# Patient Record
Sex: MALE | Race: WHITE | NOT HISPANIC OR LATINO | Employment: OTHER | URBAN - METROPOLITAN AREA
[De-identification: names, ages, dates, MRNs, and addresses within clinical notes are randomized per-mention and may not be internally consistent; named-entity substitution may affect disease eponyms.]

---

## 2018-10-09 ENCOUNTER — OFFICE VISIT - RIVER FALLS (OUTPATIENT)
Dept: FAMILY MEDICINE | Facility: CLINIC | Age: 68
End: 2018-10-09

## 2018-10-09 ENCOUNTER — AMBULATORY - RIVER FALLS (OUTPATIENT)
Dept: FAMILY MEDICINE | Facility: CLINIC | Age: 68
End: 2018-10-09

## 2018-10-09 ASSESSMENT — MIFFLIN-ST. JEOR: SCORE: 1616.29

## 2022-02-11 VITALS
HEART RATE: 55 BPM | DIASTOLIC BLOOD PRESSURE: 76 MMHG | HEIGHT: 69 IN | BODY MASS INDEX: 28.44 KG/M2 | SYSTOLIC BLOOD PRESSURE: 128 MMHG | OXYGEN SATURATION: 96 % | WEIGHT: 192 LBS

## 2022-02-16 NOTE — PROGRESS NOTES
Patient:   DISHA SHELTON            MRN: [id number]            FIN: [id number]               Age:   68 years     Sex:  Male     :  1950   Associated Diagnoses:   Encounter for examination required by Department of Transportation (DOT)   Author:   Solomon Killian MD      Results Review   General results   Today's results   10/9/2018 12:34 PM CDT hepatitis B adult vaccine 1 mL mL   10/9/2018 12:33 PM CDT influenza virus vaccine, inactivated 0.5 mL mL   10/9/2018 10:36 AM CDT Height Measured - Standard 69 in    Weight Measured - Standard 192 lb    BSA 2.06 m2    Body Mass Index 28.35 kg/m2  HI    Peripheral Pulse Rate 55 bpm  LOW    Pulse Site Radial artery    Systolic Blood Pressure 128 mmHg    Diastolic Blood Pressure 76 mmHg    Mean Arterial Pressure 93 mmHg    BP Site Right arm    Oxygen Saturation 96 %    Allergies Verified? Yes    Medication History Verified? Yes    Medical History Verified? No    Tobacco Use? Former smoker    Pre-Visit Planning Status Completed    Chief Complaint DOT exam-[facility] health,     Consent for Immunization Exchange Consent Granted    Consent for Immunizations to Providers Consent Granted    Comprehensive Intake Form Comprehensive Intake Form    Intake Form Comprehensive Intake   10/9/2018 10:25 AM CDT UA pH 5.5    UA Specific Gravity 1.020    UA Glucose NEGATIVE    UA Bilirubin NEGATIVE    UA Ketones NEGATIVE    U Occult Blood NEGATIVE    UA Protein NEGATIVE    UA Nitrite NEGATIVE    UA Leuk Est NEGATIVE    Lab Report [facility] # [id number]   10/9/2018 10:22 AM CDT Release of Information DOT PHYSICAL PER [employer] [facility] [employer]   10/9/2018 10:20 AM CDT General Diagnostic Order DOT PHYSICAL PER [facility] [employer]         Health Status   Allergies:    Allergic Reactions (Selected)  Severity Not Documented  Penicillins (No reactions were documented)   Medications:  (Selected)      Problem list:    All Problems  Allergic Rhinitis, Cause Unspecified / 477.9 / Confirmed  Mnire's disease,  unspecified / 386.00 / Confirmed  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  Resolved: Unspecified Hemorrhoids without Mention of Complication / 455.6  Resolved: Lateral Epicondylitis / 726.32  Resolved: Other Affections of Shoulder Region, Not Elsewhere Classified / 726.2  bilateral      Subjective   Problem:  Please see scanned in copy of DOT physical      Objective   Vital Signs   10/9/2018 10:36 AM CDT Peripheral Pulse Rate 55 bpm  LOW    Pulse Site Radial artery    Systolic Blood Pressure 128 mmHg    Diastolic Blood Pressure 76 mmHg    Mean Arterial Pressure 93 mmHg    BP Site Right arm    Oxygen Saturation 96 %      Measurements from flowsheet : Measurements   10/9/2018 10:36 AM CDT Height Measured - Standard 69 in    Weight Measured - Standard 192 lb    BSA 2.06 m2    Body Mass Index 28.35 kg/m2  HI         Plan   Impression and Plan:  Orders   .    Diagnosis     Encounter for examination required by Department of Transportation (DOT) (UYE73-XC Z02.89).     .    Cmxtwenty--2 year certificate

## 2022-12-18 ENCOUNTER — NURSE TRIAGE (OUTPATIENT)
Dept: NURSING | Facility: CLINIC | Age: 72
End: 2022-12-18

## 2022-12-18 NOTE — TELEPHONE ENCOUNTER
[facility] Unit Coordinator at [facility] calling requesting to schedule hospital follow up with Dr Orantes Virtual Visit on 12/21 or 12/22 for COVID 19.     Warm transferred to Central Scheduling. Advised Central Scheduling would send care team message if unable to schedule as requested.    Keren Kirkpatrick, RN  [facility]      Reason for Disposition    Requesting regular office appointment    Additional Information    Negative: [1] Caller is not with the adult (patient) AND [2] reporting urgent symptoms    Negative: Lab result questions    Negative: Medication questions    Negative: Caller can't be reached by phone    Negative: Caller has already spoken to PCP or another triager    Negative: RN needs further essential information from caller in order to complete triage    Protocols used: INFORMATION ONLY CALL - NO TRIAGE-A-

## 2022-12-22 ENCOUNTER — VIRTUAL VISIT (OUTPATIENT)
Dept: FAMILY MEDICINE | Facility: CLINIC | Age: 72
End: 2022-12-22
Payer: COMMERCIAL

## 2022-12-22 DIAGNOSIS — K21.9 GASTROESOPHAGEAL REFLUX DISEASE WITHOUT ESOPHAGITIS: ICD-10-CM

## 2022-12-22 DIAGNOSIS — U07.1 PNEUMONIA DUE TO 2019 NOVEL CORONAVIRUS: Primary | ICD-10-CM

## 2022-12-22 DIAGNOSIS — J12.82 PNEUMONIA DUE TO 2019 NOVEL CORONAVIRUS: Primary | ICD-10-CM

## 2022-12-22 PROCEDURE — 99213 OFFICE O/P EST LOW 20 MIN: CPT | Mod: 95 | Performed by: INTERNAL MEDICINE

## 2022-12-22 RX ORDER — CEFUROXIME AXETIL 500 MG/1
500 TABLET ORAL
COMMUNITY
Start: 2022-12-20 | End: 2022-12-23

## 2022-12-22 RX ORDER — FAMOTIDINE 20 MG/1
20 TABLET, FILM COATED ORAL
COMMUNITY
Start: 2022-12-20

## 2022-12-22 NOTE — PROGRESS NOTES
Henrry is a 72 year old who is being evaluated via a billable telephone visit.      What phone number would you like to be contacted at? mobile  How would you like to obtain your AVS? Mail a copy    Distant Location (provider location):  On-site    Assessment & Plan     Pneumonia due to 2019 novel coronavirus  [facility] due to COVID-pneumonia; initially with significant oxygen needs at 10 L via oxygen mask  - CTA obtained; no PE  -Precipitously improved on dexamethasone, IV antibiotics and supplemental oxygen use  -Discharged on cefuroxime (high procal) though clinical course seems most consistent with likely all viral pneumonia  -Using pulse oximetry at home which is consistently been in high 90s since discharge    GERD  -Started on famotidine during hospitalization; encouraged him to continue indefinitely though can reduce down to daily dosing  -Advised if seeing worsening of his reflux symptoms are persistent dysphagia features, then we should discuss further in clinic.         MED REC REQUIRED  Post Medication Reconciliation Status:  Discharge medications reconciled, continue medications without change      No follow-ups on file.    Braulio Orantes MD  [facility]   Henrry is a 72 year old, presenting for the following health issues:  Spoke with Joey for hospital follow-up after admission here locally due to COVID-pneumonia and hypoxic respiratory failure.  Needing significant supplemental oxygen as high as 10 L on oxygen mask on admission.  He had a rapid tapering of oxygen down to ambient air with short-term use of dexamethasone and IV antibiotics.  Rising procalcitonin in the hospital stay.  Had CTA of chest later performed that hospitalization that showed no evidence of PE.  Since discharge states he feels well.  Has been using home pulse oximeter showing oxygen saturations maintaining in the high 90s.  He does feel  "fatigued and has been pacing himself and understands it will take him a while to get back to previous activities.  Hospital F/U (COVID, congestion, \"stuffy head\", fatigue and SOB)      HPI       Hospital Follow-up Visit:    Hospital/Nursing Home/IP Rehab Facility: [facility]  Date of Admission: 12/15/2022  Date of Discharge: 12/20/2022  Reason(s) for Admission: COVID    Was your hospitalization related to COVID-19? YES   How are you feeling today? Better  In the past 24 hours have you had shortness of breath when speaking, walking, or climbing stairs? My breathing issues have stayed the same  Do you have a cough? Yes, I have a cough but it's not worse  When is the last time you had a fever greater than 100? Unknown    Are you having any other symptoms? Fatigue  Do you have any other stressors you would like to discuss with your provider? No           PHQ Assesment Total Score(s) 12/22/2022   PHQ-2 Score 0   Some recent data might be hidden                   Summary of hospitalization:  CareEverywhere information obtained and reviewed  Diagnostic Tests/Treatments reviewed.  Follow up needed: none  Other Healthcare Providers Involved in Patient s Care:         None  Update since discharge: improved.         Plan of care communicated with patient             Review of Systems   Constitutional, HEENT, cardiovascular, pulmonary, gi and gu systems are negative, except as otherwise noted.      Objective           Vitals:  No vitals were obtained today due to virtual visit.    Physical Exam   healthy, alert and no distress  PSYCH: Alert and oriented times 3; coherent speech, normal   rate and volume, able to articulate logical thoughts, able   to abstract reason, no tangential thoughts, no hallucinations   or delusions  His affect is normal  RESP: No cough, no audible wheezing, able to talk in full sentences  Remainder of exam unable to be completed due to telephone visits            Phone call duration: 15 minutes    "

## 2023-01-30 PROBLEM — Z00.00 HEALTHCARE MAINTENANCE: Status: ACTIVE | Noted: 2023-01-30

## 2023-01-31 ENCOUNTER — TELEPHONE (OUTPATIENT)
Dept: FAMILY MEDICINE | Facility: CLINIC | Age: 73
End: 2023-01-31

## 2023-01-31 ENCOUNTER — OFFICE VISIT (OUTPATIENT)
Dept: FAMILY MEDICINE | Facility: CLINIC | Age: 73
End: 2023-01-31
Payer: COMMERCIAL

## 2023-01-31 VITALS
RESPIRATION RATE: 18 BRPM | TEMPERATURE: 98.7 F | WEIGHT: 204 LBS | HEIGHT: 69 IN | BODY MASS INDEX: 30.21 KG/M2 | DIASTOLIC BLOOD PRESSURE: 78 MMHG | HEART RATE: 66 BPM | SYSTOLIC BLOOD PRESSURE: 138 MMHG

## 2023-01-31 DIAGNOSIS — F39 UNSPECIFIED MOOD (AFFECTIVE) DISORDER (H): ICD-10-CM

## 2023-01-31 DIAGNOSIS — K21.00 GASTROESOPHAGEAL REFLUX DISEASE WITH ESOPHAGITIS WITHOUT HEMORRHAGE: Primary | ICD-10-CM

## 2023-01-31 DIAGNOSIS — K21.00 GASTROESOPHAGEAL REFLUX DISEASE WITH ESOPHAGITIS WITHOUT HEMORRHAGE: ICD-10-CM

## 2023-01-31 DIAGNOSIS — Z00.00 HEALTHCARE MAINTENANCE: ICD-10-CM

## 2023-01-31 DIAGNOSIS — K21.9 GASTROESOPHAGEAL REFLUX DISEASE WITHOUT ESOPHAGITIS: Primary | ICD-10-CM

## 2023-01-31 DIAGNOSIS — K22.2 ESOPHAGEAL STRICTURE: ICD-10-CM

## 2023-01-31 DIAGNOSIS — H81.03 MENIERE'S DISEASE, BILATERAL: ICD-10-CM

## 2023-01-31 PROCEDURE — 99214 OFFICE O/P EST MOD 30 MIN: CPT | Performed by: INTERNAL MEDICINE

## 2023-01-31 RX ORDER — OMEPRAZOLE 40 MG/1
40 CAPSULE, DELAYED RELEASE ORAL DAILY
Qty: 90 CAPSULE | Refills: 3 | Status: SHIPPED | OUTPATIENT
Start: 2023-01-31

## 2023-01-31 RX ORDER — ONDANSETRON 4 MG/1
4-8 TABLET, ORALLY DISINTEGRATING ORAL EVERY 8 HOURS PRN
Qty: 30 TABLET | Refills: 1 | Status: SHIPPED | OUTPATIENT
Start: 2023-01-31

## 2023-01-31 NOTE — ASSESSMENT & PLAN NOTE
GERD -persistently worsening symptoms with dysphagia concerning for likely esophageal stricture  -Partial symptom improvement with Pepcid 20 mg daily  -Advised to escalate to omeprazole 40 mg daily he can continue to use famotidine as needed  -Making arrangements for barium swallow  -If still seeing significant dysphagia despite high-dose PPI, would plan on referral to GI for likely upper endoscopy

## 2023-01-31 NOTE — PROGRESS NOTES
"  Assessment & Plan   Problem List Items Addressed This Visit        Nervous and Auditory    Meniere's disease, bilateral     Has been working with ENT  -Historically has not responded well to as needed medications  -Provided prescription for oral disintegrating tablet Zofran to have on hand         Relevant Medications    ondansetron (ZOFRAN ODT) 4 MG ODT tab       Digestive    Gastroesophageal reflux disease with esophagitis without hemorrhage - Primary     GERD -persistently worsening symptoms with dysphagia concerning for likely esophageal stricture  -Partial symptom improvement with Pepcid 20 mg daily  -Advised to escalate to omeprazole 40 mg daily he can continue to use famotidine as needed  -Making arrangements for barium swallow  -If still seeing significant dysphagia despite high-dose PPI, would plan on referral to GI for likely upper endoscopy         Relevant Medications    omeprazole (PRILOSEC) 20 MG DR capsule    Esophageal stricture    Relevant Orders    XR Esophagram w Upper GI       Behavioral    Unspecified mood (affective) disorder (H)       Other    Healthcare maintenance     [facility]   - recalls pneumococcal vaccine x 1   - would be eligible for prevnar 20   - consensus to forego further PSA  - colonoscopy in 2020  - recall cholesterol, glucose testing through [facility] in recent years  - 12/2022: COVID pneumonia hospitalized with transient hypoxic resp failure               25 minutes spent on the date of the encounter doing review of test results, patient visit and documentation        BMI:   Estimated body mass index is 30.13 kg/m  as calculated from the following:    Height as of this encounter: 1.753 m (5' 9\").    Weight as of this encounter: 92.5 kg (204 lb).           No follow-ups on file.    Braulio Orantes MD  [facility]    Devan Slaes is a 72 year old, presenting for the following health " "issues:  Presents for follow-up.  Feels well since his COVID illness.  Would like to get more active and back exercising in the gym.  No real residual dyspnea concerns.  Still fairly regular reflux symptoms.  Certainly improved since famotidine introduction though having fairly regular nighttime symptoms as well as ongoing dysphagia.  Weight stable.  Lab Only (PSA request), Covid Concern (Follow up), and Abdominal Pain (GERD)      History of Present Illness       Reason for visit:  Acid reflux psa test    He eats 2-3 servings of fruits and vegetables daily.He consumes 1 sweetened beverage(s) daily.He exercises with enough effort to increase his heart rate 9 or less minutes per day.  He exercises with enough effort to increase his heart rate 3 or less days per week. He is missing 3 dose(s) of medications per week.         Review of Systems   Constitutional, HEENT, cardiovascular, pulmonary, gi and gu systems are negative, except as otherwise noted.      Objective    BP (!) 146/84 (BP Location: Right arm, Patient Position: Sitting, Cuff Size: Adult Large)   Pulse 66   Temp 98.7  F (37.1  C) (Tympanic)   Resp 18   Ht 1.753 m (5' 9\")   Wt 92.5 kg (204 lb)   BMI 30.13 kg/m    Body mass index is 30.13 kg/m .  Physical Exam   GENERAL: healthy, alert and no distress  NECK: no adenopathy, no asymmetry, masses, or scars and thyroid normal to palpation  RESP: lungs clear to auscultation - no rales, rhonchi or wheezes  CV: regular rate and rhythm, normal S1 S2, no S3 or S4, no murmur, click or rub, no peripheral edema and peripheral pulses strong  ABDOMEN: soft, nontender, no hepatosplenomegaly, no masses and bowel sounds normal  MS: no gross musculoskeletal defects noted, no edema                "

## 2023-01-31 NOTE — ASSESSMENT & PLAN NOTE
Has been working with ENT  -Historically has not responded well to as needed medications  -Provided prescription for oral disintegrating tablet Zofran to have on hand

## 2023-01-31 NOTE — TELEPHONE ENCOUNTER
Situation:  Clarence from [facility] calling: Omeprazole prescription, limited insurance coverage.    Background/Assessment:   Insurance will only cover one dose (20 mg) under current prescription order:      If Dr. Orantes would like to keep the original script, will have to submit a PA for 2 capsules.     Otherwise, could re-write prescription for 40mg capsule daily-and insurance will cover it.    Will route to provider to review and advise.       TRINY Johnson

## 2023-01-31 NOTE — ASSESSMENT & PLAN NOTE
[facility]   - recalls pneumococcal vaccine x 1   - would be eligible for prevnar 20   - consensus to forego further PSA  - colonoscopy in 2020  - recall cholesterol, glucose testing through [facility] in recent years  - 12/2022: COVID pneumonia hospitalized with transient hypoxic resp failure

## 2023-04-11 ENCOUNTER — TRANSFERRED RECORDS (OUTPATIENT)
Dept: HEALTH INFORMATION MANAGEMENT | Facility: CLINIC | Age: 73
End: 2023-04-11

## 2024-06-18 ENCOUNTER — TELEPHONE (OUTPATIENT)
Dept: FAMILY MEDICINE | Facility: CLINIC | Age: 74
End: 2024-06-18
Payer: COMMERCIAL

## 2024-06-18 NOTE — TELEPHONE ENCOUNTER
Patient Quality Outreach    Patient is due for the following:   Physical Annual Wellness Visit    Next Steps:   Schedule a Annual Wellness Visit    Type of outreach:    Phone, left message for patient/parent to call back.      Questions for provider review:    None           Keren Ledezma

## 2024-10-07 ENCOUNTER — TRANSFERRED RECORDS (OUTPATIENT)
Dept: HEALTH INFORMATION MANAGEMENT | Facility: CLINIC | Age: 74
End: 2024-10-07
Payer: COMMERCIAL

## 2024-10-21 ENCOUNTER — OFFICE VISIT (OUTPATIENT)
Dept: FAMILY MEDICINE | Facility: CLINIC | Age: 74
End: 2024-10-21
Payer: COMMERCIAL

## 2024-10-21 ENCOUNTER — ANCILLARY PROCEDURE (OUTPATIENT)
Dept: GENERAL RADIOLOGY | Facility: CLINIC | Age: 74
End: 2024-10-21
Attending: NURSE PRACTITIONER
Payer: COMMERCIAL

## 2024-10-21 VITALS
RESPIRATION RATE: 18 BRPM | TEMPERATURE: 99.2 F | SYSTOLIC BLOOD PRESSURE: 128 MMHG | BODY MASS INDEX: 29.67 KG/M2 | OXYGEN SATURATION: 97 % | WEIGHT: 200.3 LBS | HEART RATE: 57 BPM | HEIGHT: 69 IN | DIASTOLIC BLOOD PRESSURE: 72 MMHG

## 2024-10-21 DIAGNOSIS — R05.1 ACUTE COUGH: Primary | ICD-10-CM

## 2024-10-21 DIAGNOSIS — R05.1 ACUTE COUGH: ICD-10-CM

## 2024-10-21 PROCEDURE — 71046 X-RAY EXAM CHEST 2 VIEWS: CPT | Mod: TC | Performed by: RADIOLOGY

## 2024-10-21 PROCEDURE — 99214 OFFICE O/P EST MOD 30 MIN: CPT | Performed by: NURSE PRACTITIONER

## 2024-10-21 RX ORDER — PREDNISOLONE/MOXIFLOX/BROMFEN 1 %-0.5 %
1 SUSPENSION, DROPS(FINAL DOSAGE FORM)(ML) OPHTHALMIC (EYE)
COMMUNITY

## 2024-10-21 RX ORDER — SUMATRIPTAN SUCCINATE 100 MG/1
100 TABLET ORAL
COMMUNITY
Start: 2024-05-16

## 2024-10-21 RX ORDER — TRIAMTERENE AND HYDROCHLOROTHIAZIDE 37.5; 25 MG/1; MG/1
TABLET ORAL
COMMUNITY
Start: 2023-10-24

## 2024-10-21 RX ORDER — BENZONATATE 100 MG/1
100 CAPSULE ORAL 3 TIMES DAILY PRN
Qty: 21 CAPSULE | Refills: 0 | Status: SHIPPED | OUTPATIENT
Start: 2024-10-21 | End: 2024-10-28

## 2024-10-21 RX ORDER — MIRTAZAPINE 15 MG/1
15 TABLET, FILM COATED ORAL
COMMUNITY
Start: 2024-07-14

## 2024-10-21 ASSESSMENT — ENCOUNTER SYMPTOMS: COUGH: 1

## 2024-10-21 ASSESSMENT — PATIENT HEALTH QUESTIONNAIRE - PHQ9
10. IF YOU CHECKED OFF ANY PROBLEMS, HOW DIFFICULT HAVE THESE PROBLEMS MADE IT FOR YOU TO DO YOUR WORK, TAKE CARE OF THINGS AT HOME, OR GET ALONG WITH OTHER PEOPLE: SOMEWHAT DIFFICULT
SUM OF ALL RESPONSES TO PHQ QUESTIONS 1-9: 12
SUM OF ALL RESPONSES TO PHQ QUESTIONS 1-9: 12

## 2024-10-21 NOTE — PROGRESS NOTES
"  Assessment & Plan     Acute cough  4-day history of cough, chills and bodyaches.  Lungs are clear to auscultation, does have history of severe pneumonia with hospitalization therefore pursued chest x-ray which on my review.  Within normal limits without signs of any acute pulmonary process.  Radiology overread shows lungs are clear without signs of acute disease.  For cough, recommend he can try Tessalon Perles prescribed below.    Recommend Flonase (fluticasone) nasal spray for nasal congestion as directed.  For cough, recommend Mucinex (guaifenesin) over-the-counter as directed.  For sore throat, headache, fever or body aches may take Tylenol or ibuprofen as directed.For sore throat, may also try salt water gargles, hot water with lemon and honey and lozenges.  May benefit from cool mist vaporizer in the bedroom.     He should monitor his symptoms closely and follow-up for any worsening shortness of breath, wheezing or cough, new fever or second sickening.  - XR Chest 2 Views; Future  - benzonatate (TESSALON) 100 MG capsule; Take 1 capsule (100 mg) by mouth 3 times daily as needed for cough.      BMI  Estimated body mass index is 29.58 kg/m  as calculated from the following:    Height as of this encounter: 1.753 m (5' 9\").    Weight as of this encounter: 90.9 kg (200 lb 4.8 oz).       Depression Screening Follow Up        10/21/2024    12:59 PM   PHQ   PHQ-9 Total Score 12   Q9: Thoughts of better off dead/self-harm past 2 weeks Not at all           Follow Up Actions Taken  Follows with PCP        Subjective   Henrry is a 74 year old, presenting for the following health issues:  Cough (Cough that hurts x 4 days getting worse, chills and body aches)        10/21/2024     1:05 PM   Additional Questions   Roomed by MAGDA Demarco     Henrry is a very pleasant 74-year-old gentleman with history of COVID-pneumonia in 2022 and history of [place] (valley fever) pneumonitis as young adult when in the service who presents " "today for cough, body aches and chills onset 4 days ago.  Symptoms are worsening.  Denies fever, wheezing or chest pain.  His COVID test is negative.  Lung sounds are diminished.    History of Present Illness       Headaches:   Since the patient's last clinic visit, headaches are: worsened  The patient is getting headaches:  Weekly  He is not able to do normal daily activities when he has a migraine.  The patient is taking the following rescue/relief medications:  Tylenol and sumatriptan (Imitrex)   Patient states \"The relief is inconsistent\" from the rescue/relief medications.   The patient is taking the following medications to prevent migraines:  Other  In the past 4 weeks, the patient has gone to an Urgent Care or Emergency Room 0 times times due to headaches.    Reason for visit:  Pneumonia history  Symptom onset:  3-7 days ago He is missing 1 dose(s) of medications per week.                 Review of Systems  Constitutional, HEENT, cardiovascular, pulmonary, gi and gu systems are negative, except as otherwise noted.      Objective    /72 (BP Location: Right arm, Patient Position: Sitting, Cuff Size: Adult Large)   Pulse 57   Temp 99.2  F (37.3  C) (Tympanic)   Resp 18   Ht 1.753 m (5' 9\")   Wt 90.9 kg (200 lb 4.8 oz)   SpO2 97%   BMI 29.58 kg/m    Body mass index is 29.58 kg/m .  Physical Exam  Constitutional:       Appearance: He is ill-appearing.   HENT:      Head: Normocephalic and atraumatic.      Right Ear: Tympanic membrane normal.      Left Ear: Tympanic membrane normal.      Mouth/Throat:      Mouth: Mucous membranes are moist.   Cardiovascular:      Rate and Rhythm: Normal rate and regular rhythm.   Pulmonary:      Effort: Pulmonary effort is normal. No respiratory distress.      Breath sounds: No wheezing, rhonchi or rales.      Comments: Diminished breath sounds  Musculoskeletal:      Cervical back: Neck supple. No rigidity.   Lymphadenopathy:      Cervical: No cervical adenopathy. "   Skin:     General: Skin is warm and dry.      Capillary Refill: Capillary refill takes less than 2 seconds.   Neurological:      Mental Status: He is alert and oriented to person, place, and time.   Psychiatric:         Mood and Affect: Mood normal.         Behavior: Behavior normal.            CXR - Reviewed and interpreted by me Normal- no infiltrates, effusions, pneumothoraces, cardiomegaly or masses          Signed Electronically by: ALONA Romo CNP

## 2024-10-21 NOTE — PATIENT INSTRUCTIONS
Recommend Flonase (fluticasone) nasal spray for nasal congestion as directed.  For cough, recommend Mucinex (guaifenesin) over-the-counter as directed.  For sore throat, headache, fever or body aches may take Tylenol or ibuprofen as directed.For sore throat, may also try salt water gargles, hot water with lemon and honey and lozenges.      Recommend cool mist vaporizer in the bedroom.

## 2024-10-22 ENCOUNTER — TELEPHONE (OUTPATIENT)
Dept: FAMILY MEDICINE | Facility: CLINIC | Age: 74
End: 2024-10-22
Payer: COMMERCIAL

## 2024-10-22 NOTE — TELEPHONE ENCOUNTER
Discussed with patient and he plans to continue to monitor symptoms.  Feels he is turning the corner.

## 2024-10-22 NOTE — TELEPHONE ENCOUNTER
S-(situation):   Wife calling on behalf of patient.  [phone]  No Consent to Communicate:  (RN took information only).    B-(background):   Last OV: 10/21/2022  He should monitor his symptoms closely and follow-up for any worsening shortness of breath, wheezing or cough, new fever or second sickening.     A-(assessment):   Wife reports that phlegm has turned to yellow.  No other new symptoms.    R-(recommendations):   Requesting if antibiotic needed due to new symptom.      Radha RN, BSN  [place], [place]

## 2024-10-25 ENCOUNTER — TELEPHONE (OUTPATIENT)
Dept: FAMILY MEDICINE | Facility: CLINIC | Age: 74
End: 2024-10-25
Payer: COMMERCIAL

## 2024-10-25 DIAGNOSIS — J20.9 ACUTE BRONCHITIS, UNSPECIFIED ORGANISM: Primary | ICD-10-CM

## 2024-10-25 RX ORDER — CEFUROXIME AXETIL 500 MG/1
500 TABLET ORAL 2 TIMES DAILY
Qty: 14 TABLET | Refills: 0 | Status: SHIPPED | OUTPATIENT
Start: 2024-10-25 | End: 2024-11-01

## 2024-10-25 NOTE — TELEPHONE ENCOUNTER
Medication Question or Refill    Contacts       Contact Date/Time Type Contact Phone/Fax    10/25/2024 11:22 AM CDT Phone (Incoming) Henrry Turner (Self) [phone] (M)            What medication are you calling about (include dose and sig)?: Antibiotic    Preferred Pharmacy:   [facility] - [address] [address]  Phone: [phone] Fax: [phone]      Controlled Substance Agreement on file:   CSA -- Patient Level:    CSA: None found at the patient level.       Who prescribed the medication?: Flory Ying CNP    Do you need a refill? Yes, Patient would like to have meds called in.    When did you use the medication last? Have not used it    Patient offered an appointment? No    Do you have any questions or concerns?  Yes: Patient said that his coughing symptoms are not getting better. Patient would like to get the antibiotics that was offered to him at the appointment on Monday.      Could we send this information to you in [facility] or would you prefer to receive a phone call?:   Patient would prefer a phone call   Okay to leave a detailed message?: Yes at Cell number on file:    Telephone Information:   Mobile [phone]

## 2024-10-25 NOTE — TELEPHONE ENCOUNTER
Spoke with pt and let him know [facility] sent in a RX as requested to pharmacy on file. He verbalized understanding

## 2024-10-30 ENCOUNTER — TRANSCRIBE ORDERS (OUTPATIENT)
Dept: OTHER | Age: 74
End: 2024-10-30

## 2024-10-30 ENCOUNTER — TRANSFERRED RECORDS (OUTPATIENT)
Dept: HEALTH INFORMATION MANAGEMENT | Facility: CLINIC | Age: 74
End: 2024-10-30
Payer: COMMERCIAL

## 2024-10-30 DIAGNOSIS — R15.9 FULL INCONTINENCE OF FECES: Primary | ICD-10-CM

## 2024-11-01 NOTE — TELEPHONE ENCOUNTER
Diagnosis, Referred by & from: Full Incontinence of Feces   Appt date: 1/28/2025   NOTES STATUS DETAILS   OFFICE NOTE from referring provider Received [facility]:  10/7/24 - CR OV with Dr. Aponte   OFFICE NOTE from other specialist Received [facility]:  9/16/24 - PCC OV with Dr. Indio [facility]:  6/23/20 - PCC OV with Dr. Baird  6/10/20 - PCC OV with Dr. Bartlett    [place] [facility] [place]:  1/31/23 - PCC OV with Dr. Orantes   DISCHARGE SUMMARY from hospital Care Everywhere [facility]:  12/16/22 - Admission with Dr. France   DISCHARGE REPORT from the ER N/A    OPERATIVE REPORT Care Everywhere [facility]:  6/30/20 - OP Note for HEMORRHOIDECTOMY with Dr. Bartlett   MEDICATION LIST Received    LABS     ANAL PAP/CEA N/A    BIOPSIES/PATHOLOGY RELATED TO DIAGNOSIS Care Everywhere [facility]:  6/30/20 - Hemorrhoid Biopsy (Case: [id number])   DIAGNOSTIC PROCEDURES     PFC TESTING (from the Pelvic floor center includes Manometry, PDNL, EMG, etc.) N/A    COLONOSCOPY (most recent all time after 5 years) [facility]:  6/22/20 - Colonoscopy   FLEX SIGMOIDOSCOPY N/A    UPPER ENDOSCOPY (EGD) N/A    ERCP N/A    IMAGING (DISC & REPORT)      CT N/A    MRI N/A    XRAY N/A    ULTRASOUND  (ENDOANAL/ENDORECTAL) N/A      Records Requested  11/01/24    Facility  [facility]  Fax: [phone]  [facility]  Fax: [phone]   Outcome * 11/1/24 7:59 AM Faxed request to [facility] and [facility] for records to be faxed to the clinic. - [place]    * 11/1/24 9:29 AM Records received from [facility] and sent to HIM to be scanned into the chart. - [place]    * 11/1/24 1:53 PM Records received from [facility] and sent to HIM to be scanned into the chart. - [place]

## 2024-11-11 ENCOUNTER — TRANSFERRED RECORDS (OUTPATIENT)
Dept: HEALTH INFORMATION MANAGEMENT | Facility: CLINIC | Age: 74
End: 2024-11-11
Payer: COMMERCIAL

## 2024-12-14 ENCOUNTER — HEALTH MAINTENANCE LETTER (OUTPATIENT)
Age: 74
End: 2024-12-14

## 2024-12-22 ENCOUNTER — OFFICE VISIT (OUTPATIENT)
Dept: URGENT CARE | Facility: URGENT CARE | Age: 74
End: 2024-12-22
Payer: COMMERCIAL

## 2024-12-22 VITALS
HEART RATE: 67 BPM | SYSTOLIC BLOOD PRESSURE: 120 MMHG | DIASTOLIC BLOOD PRESSURE: 68 MMHG | OXYGEN SATURATION: 95 % | BODY MASS INDEX: 30.2 KG/M2 | WEIGHT: 204.5 LBS | TEMPERATURE: 97.6 F | RESPIRATION RATE: 18 BRPM

## 2024-12-22 DIAGNOSIS — R05.1 ACUTE COUGH: Primary | ICD-10-CM

## 2024-12-22 DIAGNOSIS — Z87.09 HISTORY OF FREQUENT UPPER RESPIRATORY INFECTION: ICD-10-CM

## 2024-12-22 LAB
ALBUMIN SERPL BCG-MCNC: 4.2 G/DL (ref 3.5–5.2)
ALP SERPL-CCNC: 84 U/L (ref 40–150)
ALT SERPL W P-5'-P-CCNC: 33 U/L (ref 0–70)
ANION GAP SERPL CALCULATED.3IONS-SCNC: 7 MMOL/L (ref 7–15)
AST SERPL W P-5'-P-CCNC: 34 U/L (ref 0–45)
BASOPHILS # BLD AUTO: 0 10E3/UL (ref 0–0.2)
BASOPHILS NFR BLD AUTO: 0 %
BILIRUB SERPL-MCNC: 0.6 MG/DL
BUN SERPL-MCNC: 18.5 MG/DL (ref 8–23)
CALCIUM SERPL-MCNC: 9.2 MG/DL (ref 8.8–10.4)
CHLORIDE SERPL-SCNC: 104 MMOL/L (ref 98–107)
CREAT SERPL-MCNC: 1.25 MG/DL (ref 0.67–1.17)
CRP SERPL-MCNC: <3 MG/L
EGFRCR SERPLBLD CKD-EPI 2021: 60 ML/MIN/1.73M2
EOSINOPHIL # BLD AUTO: 0.3 10E3/UL (ref 0–0.7)
EOSINOPHIL NFR BLD AUTO: 2 %
ERYTHROCYTE [DISTWIDTH] IN BLOOD BY AUTOMATED COUNT: 13.1 % (ref 10–15)
GLUCOSE SERPL-MCNC: 98 MG/DL (ref 70–99)
HCO3 SERPL-SCNC: 27 MMOL/L (ref 22–29)
HCT VFR BLD AUTO: 50.1 % (ref 40–53)
HGB BLD-MCNC: 16.8 G/DL (ref 13.3–17.7)
IMM GRANULOCYTES # BLD: 0 10E3/UL
IMM GRANULOCYTES NFR BLD: 0 %
LYMPHOCYTES # BLD AUTO: 3.6 10E3/UL (ref 0.8–5.3)
LYMPHOCYTES NFR BLD AUTO: 28 %
MCH RBC QN AUTO: 31.9 PG (ref 26.5–33)
MCHC RBC AUTO-ENTMCNC: 33.5 G/DL (ref 31.5–36.5)
MCV RBC AUTO: 95 FL (ref 78–100)
MONOCYTES # BLD AUTO: 1.5 10E3/UL (ref 0–1.3)
MONOCYTES NFR BLD AUTO: 12 %
NEUTROPHILS # BLD AUTO: 7.6 10E3/UL (ref 1.6–8.3)
NEUTROPHILS NFR BLD AUTO: 59 %
PLATELET # BLD AUTO: 187 10E3/UL (ref 150–450)
POTASSIUM SERPL-SCNC: 4.7 MMOL/L (ref 3.4–5.3)
PROT SERPL-MCNC: 7.2 G/DL (ref 6.4–8.3)
RBC # BLD AUTO: 5.27 10E6/UL (ref 4.4–5.9)
SODIUM SERPL-SCNC: 138 MMOL/L (ref 135–145)
WBC # BLD AUTO: 12.9 10E3/UL (ref 4–11)

## 2024-12-22 PROCEDURE — 80053 COMPREHEN METABOLIC PANEL: CPT | Performed by: FAMILY MEDICINE

## 2024-12-22 PROCEDURE — 36415 COLL VENOUS BLD VENIPUNCTURE: CPT | Performed by: FAMILY MEDICINE

## 2024-12-22 PROCEDURE — 85025 COMPLETE CBC W/AUTO DIFF WBC: CPT | Performed by: FAMILY MEDICINE

## 2024-12-22 PROCEDURE — 99214 OFFICE O/P EST MOD 30 MIN: CPT | Performed by: FAMILY MEDICINE

## 2024-12-22 PROCEDURE — 86140 C-REACTIVE PROTEIN: CPT | Performed by: FAMILY MEDICINE

## 2024-12-22 RX ORDER — DOXYCYCLINE 100 MG/1
100 CAPSULE ORAL 2 TIMES DAILY
Qty: 20 CAPSULE | Refills: 0 | Status: SHIPPED | OUTPATIENT
Start: 2024-12-22 | End: 2024-12-22

## 2024-12-22 RX ORDER — DOXYCYCLINE 100 MG/1
100 CAPSULE ORAL 2 TIMES DAILY
Qty: 20 CAPSULE | Refills: 0 | Status: SHIPPED | OUTPATIENT
Start: 2024-12-22

## 2024-12-22 NOTE — PROGRESS NOTES
Assessment & Plan     Acute cough  I prescribed doxycycline taking into account his history of respiratory infections complicated with pneumonia when they are not treated.  - CBC with platelets and differential  - CBC with platelets and differential  - doxycycline hyclate (VIBRAMYCIN) 100 MG capsule  Dispense: 20 capsule; Refill: 0    History of frequent upper respiratory infection  The patient wants to also make sure he does not have some kind of immunologic or autoimmune condition, I recommended starting with some basic labs and only if they are abnormal additional testing could be ordered, we will keep him posted with test results when available.  - CBC with platelets and differential  - CRP, inflammation  - Comprehensive metabolic panel (BMP + Alb, Alk Phos, ALT, AST, Total. Bili, TP)  - CBC with platelets and differential  - CRP, inflammation  - Comprehensive metabolic panel (BMP + Alb, Alk Phos, ALT, AST, Total. Bili, TP)             No follow-ups on file.    Ag Mendoza MD  [facility]    Devan Sales is a 74 year old male who presents to clinic today for the following health issues:  Chief Complaint   Patient presents with    URI     Cough, chest congestion, nausea, HA since yesterday           12/22/2024     9:27 AM   Additional Questions   Roomed by PAVITHRA Kapadia    .  Reports he has been having cough with dark green phlegm for the past few days, he has history of pneumonia and frequent respiratory infections, he is requesting an antibiotic because when he does not he tends to get pneumonia.  He is wondering if there is something wrong with his immune system as well.      Review of Systems        Objective    /68 (BP Location: Right arm, Patient Position: Sitting, Cuff Size: Adult Large)   Pulse 67   Temp 97.6  F (36.4  C) (Tympanic)   Resp 18   Wt 92.8 kg (204 lb 8 oz)   SpO2 95%   BMI 30.20 kg/m    Physical Exam   On exam he looks  tired but otherwise in no acute distress, vital signs were reviewed and within normal limits.  HEENT conjunctiva's are clear, oropharynx is clear and moist.  Neck supple without adenopathy.  Heart regular rate and rhythm without audible murmurs.  Lung auscultation with somewhat decreased airway transmission bilaterally but without adventitious sounds.  He has a normal gait, no evidence of lower extremity edema.    Results for orders placed or performed in visit on 12/22/24 (from the past 24 hours)   CBC with platelets and differential    Narrative    The following orders were created for panel order CBC with platelets and differential.  Procedure                               Abnormality         Status                     ---------                               -----------         ------                     CBC with platelets and d...[[id number]]                      In process                   Please view results for these tests on the individual orders.

## 2025-01-16 ENCOUNTER — TELEPHONE (OUTPATIENT)
Dept: SURGERY | Facility: CLINIC | Age: 75
End: 2025-01-16
Payer: COMMERCIAL

## 2025-01-16 NOTE — TELEPHONE ENCOUNTER
Left Voicemail (1st Attempt) and Sent Mychart (1st Attempt) for the patient to call back and schedule the following:    Appointment type: New P1   Provider: Next available Surgeon  Return date: 01/31/2025/ ( use NP Slot )  Specialty phone number: [phone]  Additional appointment(s) needed: n/a  Additonal Notes: pt needs to reschedule with MD    
right

## 2025-01-22 ENCOUNTER — OFFICE VISIT (OUTPATIENT)
Dept: FAMILY MEDICINE | Facility: CLINIC | Age: 75
End: 2025-01-22
Payer: COMMERCIAL

## 2025-01-22 VITALS
HEIGHT: 69 IN | HEART RATE: 77 BPM | WEIGHT: 204 LBS | OXYGEN SATURATION: 95 % | TEMPERATURE: 97.5 F | SYSTOLIC BLOOD PRESSURE: 146 MMHG | DIASTOLIC BLOOD PRESSURE: 70 MMHG | BODY MASS INDEX: 30.21 KG/M2

## 2025-01-22 DIAGNOSIS — Z00.00 HEALTHCARE MAINTENANCE: ICD-10-CM

## 2025-01-22 DIAGNOSIS — F33.1 MAJOR DEPRESSIVE DISORDER, RECURRENT, MODERATE (H): ICD-10-CM

## 2025-01-22 DIAGNOSIS — Z13.220 LIPID SCREENING: Primary | ICD-10-CM

## 2025-01-22 DIAGNOSIS — H81.03 MENIERE'S DISEASE, BILATERAL: ICD-10-CM

## 2025-01-22 DIAGNOSIS — Z00.00 HEALTH CARE MAINTENANCE: ICD-10-CM

## 2025-01-22 DIAGNOSIS — K21.00 GASTROESOPHAGEAL REFLUX DISEASE WITH ESOPHAGITIS WITHOUT HEMORRHAGE: ICD-10-CM

## 2025-01-22 PROBLEM — K22.2 ESOPHAGEAL STRICTURE: Status: RESOLVED | Noted: 2023-01-31 | Resolved: 2025-01-22

## 2025-01-22 PROBLEM — F39 UNSPECIFIED MOOD (AFFECTIVE) DISORDER: Status: RESOLVED | Noted: 2023-01-31 | Resolved: 2025-01-22

## 2025-01-22 PROBLEM — R05.1 ACUTE COUGH: Status: RESOLVED | Noted: 2024-12-22 | Resolved: 2025-01-22

## 2025-01-22 RX ORDER — OMEPRAZOLE 40 MG/1
40 CAPSULE, DELAYED RELEASE ORAL DAILY
Qty: 90 CAPSULE | Refills: 3 | Status: SHIPPED | OUTPATIENT
Start: 2025-01-22

## 2025-01-22 RX ORDER — ONDANSETRON 4 MG/1
4-8 TABLET, ORALLY DISINTEGRATING ORAL EVERY 8 HOURS PRN
Qty: 30 TABLET | Refills: 3 | Status: SHIPPED | OUTPATIENT
Start: 2025-01-22

## 2025-01-22 SDOH — HEALTH STABILITY: PHYSICAL HEALTH: ON AVERAGE, HOW MANY DAYS PER WEEK DO YOU ENGAGE IN MODERATE TO STRENUOUS EXERCISE (LIKE A BRISK WALK)?: 1 DAY

## 2025-01-22 ASSESSMENT — SOCIAL DETERMINANTS OF HEALTH (SDOH): HOW OFTEN DO YOU GET TOGETHER WITH FRIENDS OR RELATIVES?: ONCE A WEEK

## 2025-01-22 NOTE — ASSESSMENT & PLAN NOTE
Has been working with ENT  -Historically has not responded well to as needed medications  -oral disintegrating tablet Zofran to have on hand

## 2025-01-22 NOTE — PROGRESS NOTES
"Preventive Care Visit  [facility]  Braulio Orantes MD, Internal Medicine  Jan 22, 2025      Assessment & Plan   Problem List Items Addressed This Visit          Nervous and Auditory    Meniere's disease, bilateral     Has been working with ENT  -Historically has not responded well to as needed medications  -oral disintegrating tablet Zofran to have on hand         Relevant Medications    ondansetron (ZOFRAN ODT) 4 MG ODT tab       Digestive    Gastroesophageal reflux disease with esophagitis without hemorrhage     GERD -persistently worsening symptoms with dysphagia concerning for likely esophageal stricture  -Remains on omeprazole 40 mg daily  - prior dys[phagia complaints, resolved with higher dose PPI         Relevant Medications    omeprazole (PRILOSEC) 40 MG DR capsule       Behavioral    Major depressive disorder, recurrent, moderate (H)       Other    Healthcare maintenance     [facility]  - consensus to forego further PSA  - colonoscopy in 2020  - recall cholesterol, glucose testing through [facility] in recent years  - 12/2022: COVID pneumonia hospitalized with transient hypoxic resp failure          Other Visit Diagnoses       Lipid screening    -  Primary    Health care maintenance        Relevant Orders    REVIEW OF HEALTH MAINTENANCE PROTOCOL ORDERS (Completed)                  BMI  Estimated body mass index is 30.13 kg/m  as calculated from the following:    Height as of this encounter: 1.753 m (5' 9\").    Weight as of this encounter: 92.5 kg (204 lb).   Weight management plan: Discussed healthy diet and exercise guidelines    FUTURE APPOINTMENTS:       - Follow-up visit in 1 yr      Devan Sales is a 74 year old, presenting for the following: Presents for follow-up as well as Medicare evaluation.  Still pursuing cares through the [facility].  Seen on multiple occasions this past fall related upper respiratory tract " symptoms, generally self-limiting.  Did have labs done at that second encounter which she did raise some concerns about his kidney function with a creatinine of 1.25, estimated GFR of 60.  [facility].  Prior baseline creatinine between 1.4-1.5.  Still struggling with fairly regular vertigo complaints, Ménière's symptoms.  Worked with ENT.  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  Stable reflux symptoms on high-dose omeprazole; denies any dysphagia issues.  Generally finds Zofran helpful for nausea attributable to his vertigo  Medicare Visit        1/22/2025     7:13 AM   Additional Questions   Roomed by Jailene WHITESIDE     HPI    Health Care Directive  Patient does not have a Health Care Directive: Discussed advance care planning with patient; information given to patient to review.      1/22/2025   General Health   How would you rate your overall physical health? (!) FAIR   Feel stress (tense, anxious, or unable to sleep) Rather much   (!) STRESS CONCERN      1/22/2025   Nutrition   Diet: Low salt         1/22/2025   Exercise   Days per week of moderate/strenous exercise 1 day   (!) EXERCISE CONCERN      1/22/2025   Social Factors   Frequency of gathering with friends or relatives Once a week   Worry food won't last until get money to buy more No   Food not last or not have enough money for food? No   Do you have housing? (Housing is defined as stable permanent housing and does not include staying ouside in a car, in a tent, in an abandoned building, in an overnight shelter, or couch-surfing.) Yes   Are you worried about losing your housing? No   Lack of transportation? No   Unable to get utilities (heat,electricity)? No         1/22/2025   Fall Risk   Fallen 2 or more times in the past year? Yes   Trouble with walking or balance? Yes           1/22/2025   Activities of Daily Living- Home Safety   Needs help with the following daily  activites None of the above   Safety concerns in the home None of the above         1/22/2025   Dental   Dentist two times every year? (!) NO         1/22/2025   Hearing Screening   Hearing concerns? (!) I FEEL THAT PEOPLE ARE MUMBLING OR NOT SPEAKING CLEARLY.    (!) I NEED TO ASK PEOPLE TO SPEAK UP OR REPEAT THEMSELVES.    (!) IT'S HARD TO FOLLOW A CONVERSATION IN A NOISY RESTAURANT OR CROWDED ROOM.    (!) TROUBLE UNDESTANDING A SPEAKER IN A PUBLIC MEETING OR [employer]ICE.    (!) TROUBLE FOLLOWING DIALOGUE IN THE THEATHER.    (!) TROUBLE UNDERSTANDING SOFT OR WHISPERED SPEECH.       Multiple values from one day are sorted in reverse-chronological order         1/22/2025   Driving Risk Screening   Patient/family members have concerns about driving (!) YES          1/22/2025   General Alertness/Fatigue Screening   Have you been more tired than usual lately? (!) YES         1/22/2025   Urinary Incontinence Screening   Bothered by leaking urine in past 6 months No         1/22/2025   TB Screening   Were you born outside of the [place]? No         Today's PHQ-2 Score:       1/22/2025     7:07 AM   PHQ-2 ( 1999 [employer])   Q1: Little interest or pleasure in doing things 1   Q2: Feeling down, depressed or hopeless 1   PHQ-2 Score 2    Q1: Little interest or pleasure in doing things Several days   Q2: Feeling down, depressed or hopeless Several days   PHQ-2 Score 2       Patient-reported           1/22/2025   Substance Use   Alcohol more than 3/day or more than 7/wk No   Do you have a current opioid prescription? No   How severe/bad is pain from 1 to 10? 0/10 (No Pain)   Do you use any other substances recreationally? No    (!) PRESCRIPTION DRUGS       Multiple values from one day are sorted in reverse-chronological order     Social History     Tobacco Use    Smoking status: Never     Passive exposure: Never    Smokeless tobacco: Never   Vaping Use    Vaping status: Never Used           1/22/2025   AAA Screening   Family  "history of Abdominal Aortic Aneurysm (AAA)? No   ASCVD Risk   The ASCVD Risk score (Olga DK, et al., 2019) failed to calculate for the following reasons:    Cannot find a previous HDL lab    Cannot find a previous total cholesterol lab          Reviewed and updated as needed this visit by Provider                    Current providers sharing in care for this patient include:  Patient Care Team:  Braulio Orantes MD as PCP - General (Internal Medicine)  Nohemy Massey APRN CNP as Nurse Practitioner (Colon & Rectal)  Geoffrey Aponte MD as MD (Surgery)  Flory Kan APRN CNP as Assigned PCP    The following health maintenance items are reviewed in Epic and correct as of today:  Health Maintenance   Topic Date Due    ADVANCE CARE PLANNING  Never done    LIPID  Never done    MEDICARE ANNUAL WELLNESS VISIT  Never done    ANNUAL REVIEW OF HM ORDERS  01/31/2024    COVID-19 Vaccine (2 - 2024-25 season) 09/01/2024    RSV VACCINE (1 - 1-dose 75+ series) 02/21/2025    FALL RISK ASSESSMENT  01/22/2026    GLUCOSE  12/22/2027    COLORECTAL CANCER SCREENING  06/22/2030    DTAP/TDAP/TD IMMUNIZATION (5 - Td or Tdap) 02/14/2033    PHQ-2 (once per calendar year)  Completed    INFLUENZA VACCINE  Completed    Pneumococcal Vaccine: 50+ Years  Completed    ZOSTER IMMUNIZATION  Completed    HPV IMMUNIZATION  Aged Out    MENINGITIS IMMUNIZATION  Aged Out    RSV MONOCLONAL ANTIBODY  Aged Out    HEPATITIS C SCREENING  Discontinued         Review of Systems  Constitutional, HEENT, cardiovascular, pulmonary, gi and gu systems are negative, except as otherwise noted.     Objective    Exam  BP (!) 146/70   Pulse 77   Temp 97.5  F (36.4  C)   Resp 20   Ht 1.753 m (5' 9\")   Wt 92.5 kg (204 lb)   SpO2 95%   BMI 30.13 kg/m     Estimated body mass index is 30.13 kg/m  as calculated from the following:    Height as of this encounter: 1.753 m (5' 9\").    Weight as of this encounter: 92.5 kg (204 " lb).    Physical Exam  GENERAL: alert and no distress  NECK: no adenopathy, no asymmetry, masses, or scars  RESP: lungs clear to auscultation - no rales, rhonchi or wheezes  CV: regular rate and rhythm, normal S1 S2, no S3 or S4, no murmur, click or rub, no peripheral edema  ABDOMEN: soft, nontender, no hepatosplenomegaly, no masses and bowel sounds normal  MS: no gross musculoskeletal defects noted, no edema         1/22/2025   Mini Cog   Clock Draw Score 0 Abnormal   3 Item Recall 2 objects recalled   Mini Cog Total Score 2              Signed Electronically by: Braulio Orantes MD

## 2025-01-22 NOTE — ASSESSMENT & PLAN NOTE
GERD -persistently worsening symptoms with dysphagia concerning for likely esophageal stricture  -Remains on omeprazole 40 mg daily  - prior dys[phagia complaints, resolved with higher dose PPI

## 2025-01-22 NOTE — ASSESSMENT & PLAN NOTE
[facility]  - consensus to forego further PSA  - colonoscopy in 2020  - recall cholesterol, glucose testing through [facility] in recent years  - 12/2022: COVID pneumonia hospitalized with transient hypoxic resp failure

## 2025-01-28 ENCOUNTER — PRE VISIT (OUTPATIENT)
Dept: SURGERY | Facility: CLINIC | Age: 75
End: 2025-01-28
Payer: COMMERCIAL

## 2025-05-07 ENCOUNTER — VIRTUAL VISIT (OUTPATIENT)
Dept: FAMILY MEDICINE | Facility: CLINIC | Age: 75
End: 2025-05-07
Payer: COMMERCIAL

## 2025-05-07 DIAGNOSIS — J01.90 ACUTE SINUSITIS, RECURRENCE NOT SPECIFIED, UNSPECIFIED LOCATION: Primary | ICD-10-CM

## 2025-05-07 PROCEDURE — 98013 SYNCH AUDIO-ONLY EST LOW 20: CPT | Performed by: FAMILY MEDICINE

## 2025-05-07 RX ORDER — DOXYCYCLINE 100 MG/1
100 CAPSULE ORAL 2 TIMES DAILY
Qty: 14 CAPSULE | Refills: 0 | Status: SHIPPED | OUTPATIENT
Start: 2025-05-07

## 2025-05-07 NOTE — PROGRESS NOTES
Henrry is a 75 year old who is being evaluated via a billable telephone visit.    What phone number would you like to be contacted at?   How would you like to obtain your AVS? MyChart  Originating Location (pt. Location): Home    Distant Location (provider location):  On-site  Telephone visit completed due to the patient did not consent to a video visit.    Assessment & Plan   Problem List Items Addressed This Visit    None  Visit Diagnoses         Acute sinusitis, recurrence not specified, unspecified location    -  Primary    Relevant Medications    doxycycline hyclate (VIBRAMYCIN) 100 MG capsule             Assessment & Plan  Acute sinusitis, recurrence not specified, unspecified location  - Symptoms consistent with sinusitis, including facial pressure and pain. No drainage reported. History of sinus-related symptoms.  - Prescribe doxycycline. Recommend nasal saline irrigation and consider using Flonase (fluticasone nasal spray) to reduce tissue swelling and facilitate drainage. Advise to return if symptoms worsen.  - Risks and side effects: Doxycycline may increase sun sensitivity; patient advised of potential for sunburn.  Consent was obtained from the patient to use an AI documentation tool in the creation of  this note.  Voice recognition software was also used.  There may be associated transcribing errors.     The longitudinal plan of care for the diagnosis(es)/condition(s) as documented were addressed during this visit. Due to the added complexity in care, I will continue to support Henrry in the subsequent management and with ongoing continuity of care.          Subjective   Henrry is a 75 year old, presenting for the following health issues:  Sinus Problem (Pt c/o sweeping [employer] and now is having pressure on face under eyes and back of head x one week. He has a hx or sinus infection and thinks he has one now. )        5/7/2025     3:43 PM   Additional Questions   Roomed by Sharmaine     Sinus Problem       Henrry Turner, 75 years old, male  - Sinus infection with symptoms for about a week  - Pressure in the front of the face and sharp pain along the scalp  - Toothache in the jaw and gum area, intermittent  - Feeling worn out and down, constant  - Pain in the back of the head, at the crown, tender from time to time  - No drainage or snot  - Using nasal saline irrigation regularly  - Taking Tylenol for pain relief, started calcium supplement yesterday  - History of acute respiratory failure with low blood oxygen 3 years ago due to COVID-19  - Blood pressure was 146/70 on January 22, 2025                Objective           Vitals:  No vitals were obtained today due to virtual visit.    Physical Exam   General: Alert and no distress //Respiratory: No audible wheeze, cough, or shortness of breath // Psychiatric:  Appropriate affect, tone, and pace of words            Phone call duration: 11 minutes  Signed Electronically by: Blanquita Ontiveros MD